# Patient Record
Sex: FEMALE | Race: BLACK OR AFRICAN AMERICAN | NOT HISPANIC OR LATINO | Employment: FULL TIME | URBAN - NONMETROPOLITAN AREA
[De-identification: names, ages, dates, MRNs, and addresses within clinical notes are randomized per-mention and may not be internally consistent; named-entity substitution may affect disease eponyms.]

---

## 2023-08-17 ENCOUNTER — OFFICE VISIT (OUTPATIENT)
Dept: URGENT CARE | Facility: CLINIC | Age: 33
End: 2023-08-17
Payer: COMMERCIAL

## 2023-08-17 VITALS
HEART RATE: 84 BPM | SYSTOLIC BLOOD PRESSURE: 142 MMHG | RESPIRATION RATE: 16 BRPM | TEMPERATURE: 97 F | DIASTOLIC BLOOD PRESSURE: 70 MMHG | OXYGEN SATURATION: 97 %

## 2023-08-17 DIAGNOSIS — B34.9 VIRAL SYNDROME: ICD-10-CM

## 2023-08-17 DIAGNOSIS — R43.9 UNSPECIFIED DISTURBANCES OF SMELL AND TASTE: Primary | ICD-10-CM

## 2023-08-17 DIAGNOSIS — R09.81 NASAL CONGESTION: ICD-10-CM

## 2023-08-17 LAB
SARS-COV-2 AG UPPER RESP QL IA: NEGATIVE
VALID CONTROL: NORMAL

## 2023-08-17 PROCEDURE — 99203 OFFICE O/P NEW LOW 30 MIN: CPT

## 2023-08-17 PROCEDURE — 87811 SARS-COV-2 COVID19 W/OPTIC: CPT

## 2023-08-17 PROCEDURE — S9088 SERVICES PROVIDED IN URGENT: HCPCS

## 2023-08-17 RX ORDER — FLUTICASONE PROPIONATE 50 MCG
2 SPRAY, SUSPENSION (ML) NASAL DAILY
Qty: 11.1 ML | Refills: 0 | Status: SHIPPED | OUTPATIENT
Start: 2023-08-17

## 2023-08-17 NOTE — PROGRESS NOTES
[facility]        NAME: Dianna Michaud is a 26 y.o. female  : 1990    MRN: [id number]  DATE: 2023  TIME: 6:45 PM    Assessment and Plan   Unspecified disturbances of smell and taste [R43.9]  1. Unspecified disturbances of smell and taste  Poct Covid 19 Rapid Antigen Test      2. Nasal congestion  fluticasone (FLONASE) 50 mcg/act nasal spray      3. Viral syndrome          Rapid COVID was negative. Still suspicion for viral like illness secondary to body aches, feeling warm/cold, and upper respiratory congestion. Given advice for at home remedies to help with current symptoms and boost immune support. Given return to work note. Advised to follow-up with family doctor. Advised to go to the ER if symptoms worsen. Patient Instructions     Take prescribed medication as instructed. Tylenol or ibuprofen for pain/fever. Make sure to stay well-hydrated and rest throughout the day. May try nasal saline rinses for congestion. May also try humidifier or steam therapy at home. Daily multivitamin. Zinc.  Vitamin D3 2000 IU daily. Vitamin C 1000 mg twice daily for immune support. If no improvement, follow-up with your family doctor. If any symptoms worsen-go to the emergency room. Follow up with PCP in 3-5 days. Proceed to  ER if symptoms worsen. Chief Complaint     Chief Complaint   Patient presents with   • Generalized Body Aches   • COVID-19 Exposure     Started 2 days ago  Request COVID test  OTC tylenol         History of Present Illness       40-year-old female presents with 2 days of change in taste/smell, body aches, fatigue, nasal congestion, headaches. 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 PT states she is been taking Tylenol without relief. Denies any known fever but has been feeling hot/cold throughout the day. Denies any other medications at this time.   Denies any earache, sore throat, chest pain, short breathing, abdominal pain, nausea, vomiting, diarrhea. Review of Systems   Review of Systems   Constitutional: Positive for chills and fever. HENT: Positive for congestion and rhinorrhea. Negative for sinus pressure, sinus pain and sore throat. Eyes: Negative. Respiratory: Negative. Cardiovascular: Negative. Gastrointestinal: Negative. Musculoskeletal: Positive for myalgias. Skin: Negative. Neurological: Negative. Current Medications       Current Outpatient Medications:   •  fluticasone (FLONASE) 50 mcg/act nasal spray, 2 sprays into each nostril daily, Disp: 11.1 mL, Rfl: 0    Current Allergies     Allergies as of 08/17/2023   • (No Known Allergies)            The following portions of the patient's history were reviewed and updated as appropriate: allergies, current medications, past family history, past medical history, past social history, past surgical history and problem list.     History reviewed. No pertinent past medical history. History reviewed. No pertinent surgical history. History reviewed. No pertinent family history. Medications have been verified. Objective   /70   Pulse 84   Temp (!) 97 °F (36.1 °C)   Resp 16   SpO2 97%        Physical Exam     Physical Exam  Constitutional:       General: She is not in acute distress. Appearance: Normal appearance. She is not ill-appearing. HENT:      Head: Normocephalic and atraumatic. Right Ear: Tympanic membrane, ear canal and external ear normal.      Left Ear: Tympanic membrane, ear canal and external ear normal.      Nose: Congestion and rhinorrhea present. Right Sinus: No maxillary sinus tenderness or frontal sinus tenderness. Left Sinus: No maxillary sinus tenderness or frontal sinus tenderness. Mouth/Throat:      Mouth: Mucous membranes are moist.      Pharynx: Oropharynx is clear.  No oropharyngeal exudate or posterior oropharyngeal erythema. Eyes:      Extraocular Movements: Extraocular movements intact. Conjunctiva/sclera: Conjunctivae normal.      Pupils: Pupils are equal, round, and reactive to light. Cardiovascular:      Rate and Rhythm: Normal rate and regular rhythm. Pulses: Normal pulses. Heart sounds: Normal heart sounds. No murmur heard. No friction rub. No gallop. Pulmonary:      Effort: Pulmonary effort is normal. No respiratory distress. Breath sounds: Normal breath sounds. No stridor. No wheezing, rhonchi or rales. Chest:      Chest wall: No tenderness. Musculoskeletal:         General: No swelling, tenderness, deformity or signs of injury. Normal range of motion. Cervical back: Normal range of motion and neck supple. Lymphadenopathy:      Cervical: No cervical adenopathy. Skin:     General: Skin is warm and dry. Capillary Refill: Capillary refill takes less than 2 seconds. Neurological:      General: No focal deficit present. Mental Status: She is alert and oriented to person, place, and time. Mental status is at baseline. Cranial Nerves: No cranial nerve deficit. Sensory: No sensory deficit. Motor: No weakness.       Coordination: Coordination normal.      Gait: Gait normal.   Psychiatric:         Mood and Affect: Mood normal.         Behavior: Behavior normal.

## 2023-08-17 NOTE — PATIENT INSTRUCTIONS
Take prescribed medication as instructed. Tylenol or ibuprofen for pain/fever. Make sure to stay well-hydrated and rest throughout the day. May try nasal saline rinses for congestion. May also try humidifier or steam therapy at home. Daily multivitamin. Zinc.  Vitamin D3 2000 IU daily. Vitamin C 1000 mg twice daily for immune support. If no improvement, follow-up with your family doctor. If any symptoms worsen-go to the emergency room. Follow up with PCP in 3-5 days. Proceed to  ER if symptoms worsen. Viral Syndrome   WHAT YOU NEED TO KNOW:   Viral syndrome is a term used for symptoms of an infection caused by a virus. Viruses are spread easily from person to person on shared items. DISCHARGE INSTRUCTIONS:   Call your local emergency number (911 in the [place]), or have someone call if:   You have a seizure. You cannot be woken. You have chest pain or trouble breathing. Return to the emergency department if:   You have a stiff neck, a bad headache, and sensitivity to light. You feel weak, dizzy, or confused. You stop urinating or urinate a lot less than usual.    You cough up blood or thick yellow or green mucus. You have severe abdominal pain or your abdomen is larger than usual.    Call your doctor if:   Your symptoms do not get better with treatment or get worse after 3 days. You have a rash or ear pain. You have burning when you urinate. You have questions or concerns about your condition or care. Medicines: You may  need any of the following:  Acetaminophen  decreases pain and fever. It is available without a doctor's order. Ask how much to take and how often to take it. Follow directions. Read the labels of all other medicines you are using to see if they also contain acetaminophen, or ask your doctor or pharmacist. Acetaminophen can cause liver damage if not taken correctly. NSAIDs , such as ibuprofen, help decrease swelling, pain, and fever.  NSAIDs can cause stomach bleeding or kidney problems in certain people. If you take blood thinner medicine, always ask your healthcare provider if NSAIDs are safe for you. Always read the medicine label and follow directions. Cold medicine  helps decrease swelling, control a cough, and relieve chest or nasal congestion. Saline nasal spray  helps decrease nasal congestion. Take your medicine as directed. Contact your healthcare provider if you think your medicine is not helping or if you have side effects. Tell your provider if you are allergic to any medicine. Keep a list of the medicines, vitamins, and herbs you take. Include the amounts, and when and why you take them. Bring the list or the pill bottles to follow-up visits. Carry your medicine list with you in case of an emergency. Manage your symptoms:   Drink liquids as directed to prevent dehydration. Ask how much liquid to drink each day and which liquids are best for you. Do not drink liquids with caffeine. Caffeine can make dehydration worse. Get plenty of rest to help your body heal.  Take naps throughout the day. Ask your healthcare provider when you can return to work and your normal activities. Use a cool mist humidifier  to increase air moisture in your home. This may make it easier for you to breathe and help decrease your cough. Drink tea with honey or use cough drops for a sore throat. Cough drops are available without a doctor's order. Follow directions for taking cough drops. Do not smoke or be close to anyone who is smoking. Nicotine and other chemicals in cigarettes and cigars can cause lung damage. Smoking can also delay healing. Ask your healthcare provider for information if you currently smoke and need help to quit. E-cigarettes or smokeless tobacco still contain nicotine. Talk to your healthcare provider before you use these products. Prevent the spread of germs:   Wash your hands often throughout the day. Use soap and water.  Rub your soapy hands together, lacing your fingers, for at least 20 seconds. Rinse with warm, running water. Dry your hands with a clean towel or paper towel. Use hand  that contains alcohol if soap and water are not available. Teach children how to wash their hands and use hand . Cover sneezes and coughs. Turn your face away and cover your mouth and nose with a tissue. Throw the tissue away. Use the bend of your arm if a tissue is not available. Then wash your hands well with soap and water or use hand . Teach children how to cover a cough or sneeze. Stay home while you are sick. Avoid crowds as much as possible. Get the influenza (flu) vaccine as soon as recommended each year. The flu vaccine is available starting in September or October. Ask your healthcare provider about the pneumonia vaccine. This vaccine is usually recommended every 5 years in older adults. Follow up with your doctor as directed:  Write down your questions so you remember to ask them during your visits. © Copyright [employer] 2022 Information is for End User's use only and may not be sold, redistributed or otherwise used for commercial purposes. The above information is an  only. It is not intended as medical advice for individual conditions or treatments. Talk to your doctor, nurse or pharmacist before following any medical regimen to see if it is safe and effective for you.

## 2023-08-17 NOTE — LETTER
August 17, 2023     Patient: Roque Hawley   YOB: 1990   Date of Visit: 8/17/2023       To Whom it May Concern:    Roque Hawley was seen in my clinic on 8/17/2023. She may return to work when symptoms have improved and fever free for 24 hours without fever reducing medications. Negative rapid COVID test in our clinic on 8/17/2023. If you have any questions or concerns, please don't hesitate to call.          Sincerely,          Sandeep Rolle PA-C        CC: No Recipients

## 2023-08-31 ENCOUNTER — OFFICE VISIT (OUTPATIENT)
Dept: URGENT CARE | Facility: CLINIC | Age: 33
End: 2023-08-31
Payer: COMMERCIAL

## 2023-08-31 VITALS
SYSTOLIC BLOOD PRESSURE: 154 MMHG | RESPIRATION RATE: 18 BRPM | TEMPERATURE: 98.2 F | HEIGHT: 67 IN | OXYGEN SATURATION: 100 % | DIASTOLIC BLOOD PRESSURE: 92 MMHG | BODY MASS INDEX: 27.28 KG/M2 | WEIGHT: 173.8 LBS | HEART RATE: 86 BPM

## 2023-08-31 DIAGNOSIS — R51.9 ACUTE NONINTRACTABLE HEADACHE, UNSPECIFIED HEADACHE TYPE: Primary | ICD-10-CM

## 2023-08-31 PROCEDURE — 99213 OFFICE O/P EST LOW 20 MIN: CPT

## 2023-08-31 PROCEDURE — 96372 THER/PROPH/DIAG INJ SC/IM: CPT

## 2023-08-31 PROCEDURE — S9088 SERVICES PROVIDED IN URGENT: HCPCS

## 2023-08-31 RX ORDER — KETOROLAC TROMETHAMINE 30 MG/ML
30 INJECTION, SOLUTION INTRAMUSCULAR; INTRAVENOUS ONCE
Status: COMPLETED | OUTPATIENT
Start: 2023-08-31 | End: 2023-08-31

## 2023-08-31 RX ADMIN — KETOROLAC TROMETHAMINE 30 MG: 30 INJECTION, SOLUTION INTRAMUSCULAR; INTRAVENOUS at 08:40

## 2023-08-31 NOTE — PROGRESS NOTES
[facility]        NAME: Duran Rodriguez is a 26 y.o. female  : 1990    MRN: [id number]  DATE: 2023  TIME: 9:06 AM    Assessment and Plan   Acute nonintractable headache, unspecified headache type [R51.9]  1. Acute nonintractable headache, unspecified headache type  ketorolac (TORADOL) injection 30 mg    Transfer to other facility        Toradol 30 mg IM given in clinic today for abortive measures for acute HA    Patient Instructions   Advil 600mg + Tylenol 500mg every 6 hours  Over the counter benadryl  Drink plenty of water and avoid dehydrating fluids  Make sure to eat regularly     Differential diagnoses discussed with patient. Plan discussed with pt, HA vs Migraine discussed. Pt would like to go to the ER if Toradol does not help HA. Follow up with PCP in 3-5 days. Proceed to ER as discussed. Chief Complaint     Chief Complaint   Patient presents with   • Headache     Been going on for 2 days now non stop. History of Present Illness       Migraine  This is a new problem. The current episode started yesterday. The problem occurs constantly. The problem has been waxing and waning since onset. The pain is present in the occipital and frontal. The pain quality is not similar to prior headaches. The quality of the pain is described as throbbing. The pain is at a severity of 8/10. The pain is severe. Associated symptoms include dizziness and photophobia. Pertinent negatives include no blurred vision, eye pain, fever, insomnia, nausea, neck pain, rhinorrhea, sinus pressure, sore throat, tinnitus, visual change or vomiting. The symptoms are aggravated by bright light and noise. Past treatments include acetaminophen, NSAIDs and Excedrin. There is no history of migraine headaches or migraines in the family. Review of Systems   Review of Systems   Constitutional: Negative for fever. HENT: Negative for rhinorrhea, sinus pressure, sore throat and tinnitus.     Eyes: Positive for photophobia. Negative for blurred vision and pain. Gastrointestinal: Negative for nausea and vomiting. Musculoskeletal: Negative for neck pain. Neurological: Positive for dizziness and headaches. Psychiatric/Behavioral: The patient does not have insomnia. Current Medications     No current outpatient medications on file. No current facility-administered medications for this visit. Current Allergies     Allergies as of 08/31/2023   • (No Known Allergies)            The following portions of the patient's history were reviewed and updated as appropriate: allergies, current medications, past family history, past medical history, past social history, past surgical history and problem list.     History reviewed. No pertinent past medical history. History reviewed. No pertinent surgical history. History reviewed. No pertinent family history. Medications have been verified. Objective   /92   Pulse 86   Temp 98.2 °F (36.8 °C)   Resp 18   Ht 5' 7" (1.702 m)   Wt 78.8 kg (173 lb 12.8 oz)   SpO2 100%   BMI 27.22 kg/m²        Physical Exam     Physical Exam  Constitutional:       General: She is not in acute distress. Appearance: Normal appearance. She is not ill-appearing, toxic-appearing or diaphoretic. HENT:      Head: Normocephalic. Cardiovascular:      Rate and Rhythm: Normal rate and regular rhythm. Pulses: Normal pulses. Heart sounds: Normal heart sounds. No murmur heard. Pulmonary:      Effort: Pulmonary effort is normal. No respiratory distress. Breath sounds: Normal breath sounds. No stridor. No wheezing, rhonchi or rales. Chest:      Chest wall: No tenderness. Musculoskeletal:         General: Normal range of motion. Skin:     General: Skin is warm. Neurological:      General: No focal deficit present. Mental Status: She is alert and oriented to person, place, and time. Mental status is at baseline.

## 2023-08-31 NOTE — PATIENT INSTRUCTIONS
Advil 600mg + Tylenol 500mg every 6 hours  Over the counter benadryl  Drink plenty of water and avoid dehydrating fluids  Make sure to eat regularly     Differential diagnoses discussed with patient. Plan discussed with pt, HA vs Migraine discussed. Pt would like to go to the ER if Toradol does not help HA.

## 2024-05-29 ENCOUNTER — VBI (OUTPATIENT)
Dept: ADMINISTRATIVE | Facility: OTHER | Age: 34
End: 2024-05-29

## 2024-10-14 ENCOUNTER — VBI (OUTPATIENT)
Dept: ADMINISTRATIVE | Facility: OTHER | Age: 34
End: 2024-10-14

## 2024-10-14 NOTE — TELEPHONE ENCOUNTER
10/14/24 9:32 AM     Chart reviewed for Cervical Cancer Screening was/were not submitted to the patient's insurance.     Lisa Prasad MA   [facility]

## 2024-12-14 ENCOUNTER — VBI (OUTPATIENT)
Dept: ADMINISTRATIVE | Facility: OTHER | Age: 34
End: 2024-12-14

## 2024-12-14 NOTE — TELEPHONE ENCOUNTER
12/14/24 8:09 AM     Chart reviewed for Cervical Cancer Screening was/were not submitted to the patient's insurance.     Lisa Prasad MA   [facility]